# Patient Record
Sex: FEMALE | Race: WHITE | NOT HISPANIC OR LATINO | Employment: FULL TIME | URBAN - METROPOLITAN AREA
[De-identification: names, ages, dates, MRNs, and addresses within clinical notes are randomized per-mention and may not be internally consistent; named-entity substitution may affect disease eponyms.]

---

## 2017-03-09 ENCOUNTER — HOSPITAL ENCOUNTER (EMERGENCY)
Facility: HOSPITAL | Age: 42
Discharge: HOME | End: 2017-03-09
Attending: EMERGENCY MEDICINE
Payer: COMMERCIAL

## 2017-03-09 VITALS
HEIGHT: 62 IN | TEMPERATURE: 98 F | WEIGHT: 145 LBS | DIASTOLIC BLOOD PRESSURE: 84 MMHG | SYSTOLIC BLOOD PRESSURE: 156 MMHG | BODY MASS INDEX: 26.68 KG/M2 | RESPIRATION RATE: 18 BRPM | HEART RATE: 100 BPM | OXYGEN SATURATION: 100 %

## 2017-03-09 DIAGNOSIS — E87.6 HYPOKALEMIA: ICD-10-CM

## 2017-03-09 DIAGNOSIS — R11.2 INTRACTABLE VOMITING WITH NAUSEA, UNSPECIFIED VOMITING TYPE: Primary | ICD-10-CM

## 2017-03-09 LAB
ALBUMIN SERPL BCP-MCNC: 4.5 G/DL
ALP SERPL-CCNC: 54 U/L
ALT SERPL W/O P-5'-P-CCNC: 40 U/L
ANION GAP SERPL CALC-SCNC: 13 MMOL/L
AST SERPL-CCNC: 26 U/L
BILIRUB SERPL-MCNC: 0.5 MG/DL
BUN SERPL-MCNC: 16 MG/DL
CALCIUM SERPL-MCNC: 9.4 MG/DL
CHLORIDE SERPL-SCNC: 97 MMOL/L
CO2 SERPL-SCNC: 28 MMOL/L
CREAT SERPL-MCNC: 0.8 MG/DL
EST. GFR  (AFRICAN AMERICAN): >60 ML/MIN/1.73 M^2
EST. GFR  (NON AFRICAN AMERICAN): >60 ML/MIN/1.73 M^2
GLUCOSE SERPL-MCNC: 125 MG/DL
MAGNESIUM SERPL-MCNC: 1.9 MG/DL
POTASSIUM SERPL-SCNC: 3 MMOL/L
PROT SERPL-MCNC: 8.2 G/DL
SODIUM SERPL-SCNC: 138 MMOL/L

## 2017-03-09 PROCEDURE — 25000003 PHARM REV CODE 250: Performed by: EMERGENCY MEDICINE

## 2017-03-09 PROCEDURE — 99284 EMERGENCY DEPT VISIT MOD MDM: CPT | Mod: 25

## 2017-03-09 PROCEDURE — 96361 HYDRATE IV INFUSION ADD-ON: CPT

## 2017-03-09 PROCEDURE — 36415 COLL VENOUS BLD VENIPUNCTURE: CPT

## 2017-03-09 PROCEDURE — 63600175 PHARM REV CODE 636 W HCPCS: Performed by: EMERGENCY MEDICINE

## 2017-03-09 PROCEDURE — 93005 ELECTROCARDIOGRAM TRACING: CPT

## 2017-03-09 PROCEDURE — 96374 THER/PROPH/DIAG INJ IV PUSH: CPT

## 2017-03-09 PROCEDURE — 96375 TX/PRO/DX INJ NEW DRUG ADDON: CPT

## 2017-03-09 PROCEDURE — 80053 COMPREHEN METABOLIC PANEL: CPT

## 2017-03-09 PROCEDURE — 83735 ASSAY OF MAGNESIUM: CPT

## 2017-03-09 RX ORDER — DEXTROAMPHETAMINE SACCHARATE, AMPHETAMINE ASPARTATE MONOHYDRATE, DEXTROAMPHETAMINE SULFATE AND AMPHETAMINE SULFATE 5; 5; 5; 5 MG/1; MG/1; MG/1; MG/1
20 CAPSULE, EXTENDED RELEASE ORAL EVERY MORNING
COMMUNITY

## 2017-03-09 RX ORDER — DIPHENHYDRAMINE HYDROCHLORIDE 50 MG/ML
25 INJECTION INTRAMUSCULAR; INTRAVENOUS
Status: COMPLETED | OUTPATIENT
Start: 2017-03-09 | End: 2017-03-09

## 2017-03-09 RX ORDER — POTASSIUM CHLORIDE 20 MEQ/15ML
40 SOLUTION ORAL
Status: COMPLETED | OUTPATIENT
Start: 2017-03-09 | End: 2017-03-09

## 2017-03-09 RX ORDER — PROCHLORPERAZINE EDISYLATE 5 MG/ML
10 INJECTION INTRAMUSCULAR; INTRAVENOUS
Status: COMPLETED | OUTPATIENT
Start: 2017-03-09 | End: 2017-03-09

## 2017-03-09 RX ORDER — FLUCONAZOLE 100 MG/1
200 TABLET ORAL
Status: COMPLETED | OUTPATIENT
Start: 2017-03-09 | End: 2017-03-09

## 2017-03-09 RX ORDER — ONDANSETRON 4 MG/1
4 TABLET, ORALLY DISINTEGRATING ORAL EVERY 8 HOURS PRN
Qty: 12 TABLET | Refills: 0 | Status: SHIPPED | OUTPATIENT
Start: 2017-03-09

## 2017-03-09 RX ADMIN — DIPHENHYDRAMINE HYDROCHLORIDE 25 MG: 50 INJECTION, SOLUTION INTRAMUSCULAR; INTRAVENOUS at 06:03

## 2017-03-09 RX ADMIN — FLUCONAZOLE 200 MG: 100 TABLET ORAL at 06:03

## 2017-03-09 RX ADMIN — SODIUM CHLORIDE 1000 ML: 0.9 INJECTION, SOLUTION INTRAVENOUS at 05:03

## 2017-03-09 RX ADMIN — POTASSIUM CHLORIDE 40 MEQ: 20 SOLUTION ORAL at 06:03

## 2017-03-09 RX ADMIN — PROCHLORPERAZINE EDISYLATE 10 MG: 5 INJECTION INTRAMUSCULAR; INTRAVENOUS at 06:03

## 2017-03-09 NOTE — ED PROVIDER NOTES
Encounter Date: 3/9/2017       History     Chief Complaint   Patient presents with    Emesis     began at 0200; no abdominal pain; shaking; negative Flu test recent     Review of patient's allergies indicates:   Allergen Reactions    Toradol [ketorolac] Hives and Shortness Of Breath    Treximet [sumatriptan-naproxen] Hives     HPI Comments: 41-year-old female with a past medical history of migraine headaches and ADD presents with a chief complaint of nausea and vomiting.  The patient reports her symptoms started acutely around 2 AM, and are associated with shaking.  The patient reports that she vomited so much that she now has one of her chronic migraine headaches.  The patient has been having upper respiratory symptoms for the last couple of weeks, including cough, and myalgias.  The patient has not taken anything for relief of her symptoms.  There has been no associated fever or chills tonight.  There are no aggravating factors.    The history is provided by the patient.     Past Medical History:   Diagnosis Date    ADD (attention deficit disorder)     Migraine headache      Past Surgical History:   Procedure Laterality Date    breast augmentation      CHOLECYSTECTOMY      kidney stones      TUBAL LIGATION       History reviewed. No pertinent family history.  Social History   Substance Use Topics    Smoking status: Never Smoker    Smokeless tobacco: None    Alcohol use Yes      Comment: occ     Review of Systems   Constitutional: Negative for chills and fever.   HENT: Negative for congestion.    Respiratory: Negative for cough and shortness of breath.    Cardiovascular: Negative for chest pain.   Gastrointestinal: Positive for nausea and vomiting. Negative for abdominal pain.   Genitourinary: Negative for dysuria.   Musculoskeletal: Negative for gait problem.   Skin: Negative for color change.   Neurological: Positive for headaches. Negative for dizziness and numbness.   Psychiatric/Behavioral:  Negative for agitation.       Physical Exam   Initial Vitals   BP Pulse Resp Temp SpO2   03/09/17 0518 03/09/17 0518 03/09/17 0518 -- 03/09/17 0518   151/85 106 16  100 %     Physical Exam    Nursing note and vitals reviewed.  Constitutional: She appears well-developed and well-nourished.   HENT:   Head: Atraumatic.   Eyes: EOM are normal. Pupils are equal, round, and reactive to light.   Neck: Normal range of motion.   Cardiovascular: Normal rate and regular rhythm.   Pulmonary/Chest: Breath sounds normal.   Abdominal: Soft. Bowel sounds are normal.   Musculoskeletal: Normal range of motion.        Right shoulder: Normal.        Left shoulder: Normal.   Neurological: She is alert and oriented to person, place, and time. She has normal strength. No cranial nerve deficit or sensory deficit.   Skin: Skin is warm and dry.   Psychiatric: She has a normal mood and affect.         ED Course   Procedures  Labs Reviewed   COMPREHENSIVE METABOLIC PANEL - Abnormal; Notable for the following:        Result Value    Potassium 3.0 (*)     Glucose 125 (*)     Alkaline Phosphatase 54 (*)     All other components within normal limits   MAGNESIUM             Medical Decision Making:   Initial Assessment:   41-year-old female presented with a chief complaint of nausea and vomiting.  Differential Diagnosis:   Initial differential diagnosis included but not limited to enteritis, gastritis, migraine, and viral illness.  Clinical Tests:   Lab Tests: Ordered and Reviewed  ED Management:  The patient was emergently evaluated in the ED, her evaluation was significant for a young female with a benign abdominal exam.  The patient was aggressively treated with IV fluids and IV medications in the ED, with significant improvement in her symptoms.  The patient's labs were significant for mild hypokalemia, which was replaced by mouth in the ED.  The patient is stable for discharge to home.  The etiology of her vomiting is likely a viral illness.   She is stable for discharge to home.  She will be discharged home with ODT Zofran and she is to follow-up with her PCP for further care.                     ED Course     Clinical Impression:   The primary encounter diagnosis was Intractable vomiting with nausea, unspecified vomiting type. A diagnosis of Hypokalemia was also pertinent to this visit.          Kel Pittman MD  03/10/17 0040

## 2017-03-09 NOTE — ED NOTES
Patient identifiers for Susan Fernandez checked and correct.  LOC: Patient is awake, alert, and aware of environment with an appropriate affect. Patient is oriented x 3 and speaking appropriately.  APPEARANCE: Patient resting comfortably and in no acute distress. Patient is clean and well groomed, patient's clothing is properly fastened.  SKIN: The skin is warm and dry. Patient has normal skin turgor and moist mucus membrances. Skin is intact; no bruising or breakdown noted.  MUSCULOSKELETAL: Patient is moving all extremities well, no obvious deformities noted. Pulses intact.   RESPIRATORY: Airway is open and patent. Respirations are spontaneous and non-labored with normal effort and rate.  CARDIAC: Patient has a normal rate and rhythm. No peripheral edema noted. Capillary refill < 3 seconds.  ABDOMEN: No distention noted. Bowel sounds active in all 4 quadrants. Soft and non-tender upon palpation.  NEUROLOGICAL: PERRL. Facial expression is symmetrical. Hand grasps are equal bilaterally. Normal sensation in all extremities when touched with finger. Pt reports headache/migraine onset this morning with nausea and vomiting all night  Allergies reported:   Review of patient's allergies indicates:   Allergen Reactions    Toradol [ketorolac] Hives and Shortness Of Breath    Treximet [sumatriptan-naproxen] Hives

## 2017-03-09 NOTE — DISCHARGE INSTRUCTIONS
Hypokalemia  Hypokalemia means a low level of potassium in the blood. This most often occurs in patients who take diuretics (water pills). It can also occur due to severe vomiting or diarrhea.  It is also seen in people who take laxatives for long periods of time.  A mild case usually causes no symptoms. It is only found with blood testing. More severe potassium loss causes generalized weakness, muscle or abdominal cramping, heart palpitations (rapid or irregular heartbeats) and low blood pressure.  Home care  · Take any potassium supplements prescribed.  · Eat foods rich in potassium. The highest amount is found in artichoke, baked potatoes, spinach, cantaloupe, honeydew melon, cod, halibut, salmon, and scallops. White, red, or flores beans are also very good sources. A modest amount is found in orange juice, bananas, carrots, and tomato juice.  · If you take certain types of diuretics, you will also need to take potassium supplements. If take a diuretic, discuss potassium supplements with your doctor.  Follow-up care  Follow up with your healthcare provider for a repeat blood test within the next week or as advised by our staff.  When to seek medical advice  Call your healthcare provider if any of the following occur:  · Increased weakness, fatigue, muscle cramps  · Dizziness  Call 911  Call 911 if any of the following occur:  · Irregular heartbeat, extra beats or very fast heart rate  · Loss of consciousness  Date Last Reviewed: 7/26/2015  © 8723-1713 [employer]. [address] [place] [address], [place] [address]. All rights reserved. This information is not intended as a substitute for professional medical care. Always follow your healthcare professional's instructions.          Diet for Vomiting (Child)    The first step to treat vomiting and prevent dehydration is to give small amounts of fluids often.  · Start with oral rehydration solution. You can get this at drugstores and most groceries without a  prescription. Give 1 to 2 teaspoons (5 ml to10 ml) every 1 to 2 minutes. Even if vomiting occurs, keep giving it as directed. Even while vomiting, your child will absorb most of the fluid.  · As your child vomits less, give larger amounts of rehydration solution at longer intervals. Do this until your child is making urine and is no longer thirsty (has no interest in drinking). Don't give your child plain water, milk, formula, or other liquids until vomiting stops.  · If frequent vomiting continues for more than 2 hours despite the above method, call your child's healthcare provider. He or she may prescribe a medicine that can make the vomiting stop.  Note: Your child may be thirsty and want to drink faster, but if vomiting, give fluids only as directed above. The idea is not to fill the stomach with each feeding. This can cause more vomiting.  The following guidelines will help you continue to care for your child:  · After 12 to 24 hours with no vomiting, resume solid foods. This includes rice cereal, other cereals, oatmeal, bread, noodles, mashed bananas, mashed potatoes, rice, applesauce, dry toast, crackers, soups with rice or noodles, and cooked vegetables. Give as much fluid as your child wants.  · After 24 hours with no vomiting, resume a normal diet.  When to call your healthcare provider  Call your child's healthcare provider right away if:  · Your child complains of severe abdominal pain  · Your child has a severe headache  · If the vomit becomes bloody or bright yellow or green  · If you are worried your child is dehydrated  Date Last Reviewed: 1/11/2016  © 2539-4119 [employer]. [address] [place] [address]. All rights reserved. This information is not intended as a substitute for professional medical care. Always follow your healthcare professional's instructions.          Diet for Vomiting (Child)    The first step to treat vomiting and prevent dehydration is to give small  amounts of fluids often.  · Start with oral rehydration solution. You can get this at drugstores and most groceries without a prescription. Give 1 to 2 teaspoons (5 ml to10 ml) every 1 to 2 minutes. Even if vomiting occurs, keep giving it as directed. Even while vomiting, your child will absorb most of the fluid.  · As your child vomits less, give larger amounts of rehydration solution at longer intervals. Do this until your child is making urine and is no longer thirsty (has no interest in drinking). Don't give your child plain water, milk, formula, or other liquids until vomiting stops.  · If frequent vomiting continues for more than 2 hours despite the above method, call your child's healthcare provider. He or she may prescribe a medicine that can make the vomiting stop.  Note: Your child may be thirsty and want to drink faster, but if vomiting, give fluids only as directed above. The idea is not to fill the stomach with each feeding. This can cause more vomiting.  The following guidelines will help you continue to care for your child:  · After 12 to 24 hours with no vomiting, resume solid foods. This includes rice cereal, other cereals, oatmeal, bread, noodles, mashed bananas, mashed potatoes, rice, applesauce, dry toast, crackers, soups with rice or noodles, and cooked vegetables. Give as much fluid as your child wants.  · After 24 hours with no vomiting, resume a normal diet.  When to call your healthcare provider  Call your child's healthcare provider right away if:  · Your child complains of severe abdominal pain  · Your child has a severe headache  · If the vomit becomes bloody or bright yellow or green  · If you are worried your child is dehydrated  Date Last Reviewed: 1/11/2016  © 4644-6115 [employer]. [address] [place] [address], [place] [address]. All rights reserved. This information is not intended as a substitute for professional medical care. Always follow your healthcare professional's  instructions.

## 2017-03-09 NOTE — ED AVS SNAPSHOT
[facility] [address] [place] [address]               Susan BENDER Jim   3/9/2017  5:25 AM   ED    Description:  Female : 1975   Department:  [facility]           Your Care was Coordinated By:     Provider Role From To    Kel Pittman MD Attending Provider 17 0532 --      Reason for Visit     Emesis           Diagnoses this Visit        Comments    Intractable vomiting with nausea, unspecified vomiting type    -  Primary     Hypokalemia           ED Disposition     None           To Do List           Follow-up Information     Schedule an appointment as soon as possible for a visit in 1 week to follow up.    Why:  with PCP       These Medications        Disp Refills Start End    ondansetron (ZOFRAN-ODT) 4 MG TbDL 12 tablet 0 3/9/2017     Take 1 tablet (4 mg total) by mouth every 8 (eight) hours as needed (nausea/vomiting). - Oral      [facility] On Call     [facility] Care [facility] -  Assistance  Registered nurses in the [facility] provide clinical advisement, health education, appointment booking, and other advisory services.  Call for this free service at [phone].             Medications           Message regarding Medications     Verify the changes and/or additions to your medication regime listed below are the same as discussed with your clinician today.  If any of these changes or additions are incorrect, please notify your healthcare provider.        START taking these NEW medications        Refills    ondansetron (ZOFRAN-ODT) 4 MG TbDL 0    Sig: Take 1 tablet (4 mg total) by mouth every 8 (eight) hours as needed (nausea/vomiting).    Class: Print    Route: Oral      These medications were administered today        Dose Freq    sodium chloride 0.9% bolus 1,000 mL 1,000 mL ED 1 Time    Sig: Inject 1,000 mLs into the vein ED 1 Time.    Class: Normal    Route: Intravenous    prochlorperazine  "injection 10 mg 10 mg ED 1 Time    Sig: Inject 2 mLs (10 mg total) into the vein ED 1 Time.    Class: Normal    Route: Intravenous    diphenhydrAMINE injection 25 mg 25 mg ED 1 Time    Sig: Inject 0.5 mLs (25 mg total) into the vein ED 1 Time.    Class: Normal    Route: Intravenous    potassium chloride 10% solution 40 mEq 40 mEq ED 1 Time    Sig: Take 30 mLs (40 mEq total) by mouth ED 1 Time.    Class: Normal    Route: Oral           Verify that the below list of medications is an accurate representation of the medications you are currently taking.  If none reported, the list may be blank. If incorrect, please contact your healthcare provider. Carry this list with you in case of emergency.           Current Medications     dextroamphetamine-amphetamine (ADDERALL XR) 20 MG 24 hr capsule Take 20 mg by mouth every morning.    ondansetron (ZOFRAN-ODT) 4 MG TbDL Take 1 tablet (4 mg total) by mouth every 8 (eight) hours as needed (nausea/vomiting).    potassium chloride 10% solution 40 mEq Take 30 mLs (40 mEq total) by mouth ED 1 Time.           Clinical Reference Information           Your Vitals Were     BP Pulse Resp Height Weight SpO2    151/85 (BP Location: Right arm, Patient Position: Sitting) 106 16 5' 2" (1.575 m) 65.8 kg (145 lb) 100%    BMI                26.52 kg/m2          Allergies as of 3/9/2017        Reactions    Toradol [Ketorolac] Hives, Shortness Of Breath    Treximet [Sumatriptan-naproxen] Hives      Immunizations Administered on Date of Encounter - 3/9/2017     None      ED Micro, Lab, POCT     Start Ordered       Status Ordering Provider    03/09/17 0545 03/09/17 0545  Comprehensive Metabolic Panel (CMP)  STAT      Final result     03/09/17 0545 03/09/17 0545  Magnesium  STAT      Final result       ED Imaging Orders     None        Discharge Instructions         Hypokalemia  Hypokalemia means a low level of potassium in the blood. This most often occurs in patients who take diuretics (water " pills). It can also occur due to severe vomiting or diarrhea.  It is also seen in people who take laxatives for long periods of time.  A mild case usually causes no symptoms. It is only found with blood testing. More severe potassium loss causes generalized weakness, muscle or abdominal cramping, heart palpitations (rapid or irregular heartbeats) and low blood pressure.  Home care  · Take any potassium supplements prescribed.  · Eat foods rich in potassium. The highest amount is found in artichoke, baked potatoes, spinach, cantaloupe, honeydew melon, cod, halibut, salmon, and scallops. White, red, or flores beans are also very good sources. A modest amount is found in orange juice, bananas, carrots, and tomato juice.  · If you take certain types of diuretics, you will also need to take potassium supplements. If take a diuretic, discuss potassium supplements with your doctor.  Follow-up care  Follow up with your healthcare provider for a repeat blood test within the next week or as advised by our staff.  When to seek medical advice  Call your healthcare provider if any of the following occur:  · Increased weakness, fatigue, muscle cramps  · Dizziness  Call 911  Call 911 if any of the following occur:  · Irregular heartbeat, extra beats or very fast heart rate  · Loss of consciousness  Date Last Reviewed: 7/26/2015  © 1817-5936 [employer]. [address] [place] [address]. All rights reserved. This information is not intended as a substitute for professional medical care. Always follow your healthcare professional's instructions.          Diet for Vomiting (Child)    The first step to treat vomiting and prevent dehydration is to give small amounts of fluids often.  · Start with oral rehydration solution. You can get this at drugstores and most groceries without a prescription. Give 1 to 2 teaspoons (5 ml to10 ml) every 1 to 2 minutes. Even if vomiting occurs, keep giving it as directed. Even  while vomiting, your child will absorb most of the fluid.  · As your child vomits less, give larger amounts of rehydration solution at longer intervals. Do this until your child is making urine and is no longer thirsty (has no interest in drinking). Don't give your child plain water, milk, formula, or other liquids until vomiting stops.  · If frequent vomiting continues for more than 2 hours despite the above method, call your child's healthcare provider. He or she may prescribe a medicine that can make the vomiting stop.  Note: Your child may be thirsty and want to drink faster, but if vomiting, give fluids only as directed above. The idea is not to fill the stomach with each feeding. This can cause more vomiting.  The following guidelines will help you continue to care for your child:  · After 12 to 24 hours with no vomiting, resume solid foods. This includes rice cereal, other cereals, oatmeal, bread, noodles, mashed bananas, mashed potatoes, rice, applesauce, dry toast, crackers, soups with rice or noodles, and cooked vegetables. Give as much fluid as your child wants.  · After 24 hours with no vomiting, resume a normal diet.  When to call your healthcare provider  Call your child's healthcare provider right away if:  · Your child complains of severe abdominal pain  · Your child has a severe headache  · If the vomit becomes bloody or bright yellow or green  · If you are worried your child is dehydrated  Date Last Reviewed: 1/11/2016  © 8459-9555 [employer]. [address] [place] [address], [place] [address]. All rights reserved. This information is not intended as a substitute for professional medical care. Always follow your healthcare professional's instructions.          Diet for Vomiting (Child)    The first step to treat vomiting and prevent dehydration is to give small amounts of fluids often.  · Start with oral rehydration solution. You can get this at drugstores and most groceries without a  prescription. Give 1 to 2 teaspoons (5 ml to10 ml) every 1 to 2 minutes. Even if vomiting occurs, keep giving it as directed. Even while vomiting, your child will absorb most of the fluid.  · As your child vomits less, give larger amounts of rehydration solution at longer intervals. Do this until your child is making urine and is no longer thirsty (has no interest in drinking). Don't give your child plain water, milk, formula, or other liquids until vomiting stops.  · If frequent vomiting continues for more than 2 hours despite the above method, call your child's healthcare provider. He or she may prescribe a medicine that can make the vomiting stop.  Note: Your child may be thirsty and want to drink faster, but if vomiting, give fluids only as directed above. The idea is not to fill the stomach with each feeding. This can cause more vomiting.  The following guidelines will help you continue to care for your child:  · After 12 to 24 hours with no vomiting, resume solid foods. This includes rice cereal, other cereals, oatmeal, bread, noodles, mashed bananas, mashed potatoes, rice, applesauce, dry toast, crackers, soups with rice or noodles, and cooked vegetables. Give as much fluid as your child wants.  · After 24 hours with no vomiting, resume a normal diet.  When to call your healthcare provider  Call your child's healthcare provider right away if:  · Your child complains of severe abdominal pain  · Your child has a severe headache  · If the vomit becomes bloody or bright yellow or green  · If you are worried your child is dehydrated  Date Last Reviewed: 1/11/2016 © 2000-2016 [employer]. [address] [place] [address]. All rights reserved. This information is not intended as a substitute for professional medical care. Always follow your healthcare professional's instructions.          Discharge References/Attachments     VOMITING (ADULT) (ENGLISH)      [facility] Sign-Up     Activating  your [facility] account is as easy as 1-2-3!     1) Visit [email or web address], select Sign Up Now, enter this activation code and your date of birth, then select Next.  [email or web address]  Expires: 4/23/2017  6:32 AM      2) Create a username and password to use when you visit [facility] in the future and select a security question in case you lose your password and select Next.    3) Enter your e-mail address and click Sign Up!    Additional Information  If you have questions, please e-mail [email or web address] or call [phone] to talk to our [facility] staff. Remember, [facility] is NOT to be used for urgent needs. For medical emergencies, dial 911.          [facility] complies with applicable Federal civil rights laws and does not discriminate on the basis of race, color, national origin, age, disability, or sex.        Language Assistance Services     ATTENTION: Language assistance services are available, free of charge. Please call [phone].      ATENCIÓN: Si habla español, tiene a wylie disposición servicios gratuitos de asistencia lingüística. Llame al [phone].     ARVIND Ý: N?u b?n nói Ti?ng Vi?t, có các d?ch v? h? tr? ngôn ng? mi?n phí dành cho b?n. G?i s? [phone].